# Patient Record
(demographics unavailable — no encounter records)

---

## 2025-04-18 NOTE — PROCEDURE
[FreeTextEntry1] : bilateral sclerotherapy [FreeTextEntry3] : Procedural safety checklist and time out completed: Confirmed patient identification (Patient Name, , and/or medical record number including when possible affirmation by patient or parent/family/other). Confirmed procedure with the patient. Consent present, accurate and signed. Confirmed special equipment and supplies are present. Sterility confirmed. Position verified. Site/ side is marked and visible and confirmed. Procedure confirmed by consent. Accurate consent including side and site. Review of medical records, including venous ultrasound, noting correct procedure including site and side. MD/PA verifies presence and review of imaging studies and or written report of imaging studies. Agreement on the procedure to be performed. Time out completed. All of the above has been confirmed by the team. All patient-specific concerns have been addressed.   Indication:  bilateral lower extremity spider veins.   Procedure: bilateral lower extremity sclerotherapy.   Procedure Note: Ms. KEN SIMS is a 45 year old F with bilateral lower extremity spider and branch veins. She was previously evaluated for VI one year ago in May 2024 and then referred by Dr Shah for cosmetic sclerotherapy. She was rescheduled to today.    I have discussed the risks of the procedure with the patient. Detailed discussion was held with the patient. Risks of itching, superficial thrombophlebitis, hyperpigmentation (darkening of the skin), allergic reactions, skin irritation due to extravasation of the sclerosant, air-embolism, and in rare cases deep vein thrombosis were all discussed with the patient.  Reviewed with patient that sclerotherapy is the injection of a sterile agent (polidocanol) into the spider and small branch varicose veins. It works by damaging the inner wall of the vein. Eventually, the vein collapses on itself and over time, the damaged vein is replaced with fibrous connective tissue and prevents blood flow through the vessel. Sclerotherapy does not prevent the development of new veins. Before the treatment, photos may be obtained. Advised that she was last evaluated for VI one year ago and we typically repeat an ultrasound prior to cosmetic sclerotherapy. She denies leg pain, swelling or fatigue and achiness.   The patient agrees to the procedure. The patient was escorted into the procedure room, placed on the procedure table and a time out was called. The legs for sclerotherapy treatment were cleaned with 70% isopropyl alcohol.   Sclerosant used was 0.5 % polidocanol (Asclera). Each session consists of a total dose of 4 mL of 0.5% Asclera (polidocanol) which is directly injected using a 30-gauge needle into the superficial spider veins and small branch veins.   1st session today 2025 The following areas were injected RLE proximal lateral thigh, popliteal fossa (foam), medial anterior knee underlying the scar incision (foam), posterior thigh. LLAE popliteal fossa (foam), lateral ankle & calf, lateral & posterior thigh 0.5 % Asclera  lot# [id number], expiration 2027    Dry gauze was applied to the treated areas of the leg and a compression bandage was applied. Patient instructed to wear the bandage for 72 hours and then wear 20-30mmHg compression stockings daily for minimum of 2 weeks. Patient may remove compression bandage after 24 hrs, shower and don compression stockings for continuous compression x 72 hrs. Patient tolerated the procedure well. A follow-up appt is scheduled for the patient and instructions provided.